# Patient Record
Sex: FEMALE | Race: WHITE
[De-identification: names, ages, dates, MRNs, and addresses within clinical notes are randomized per-mention and may not be internally consistent; named-entity substitution may affect disease eponyms.]

---

## 2018-09-23 ENCOUNTER — HOSPITAL ENCOUNTER (EMERGENCY)
Dept: HOSPITAL 25 - UCEAST | Age: 33
Discharge: HOME | End: 2018-09-23
Payer: COMMERCIAL

## 2018-09-23 VITALS — SYSTOLIC BLOOD PRESSURE: 114 MMHG | DIASTOLIC BLOOD PRESSURE: 74 MMHG

## 2018-09-23 DIAGNOSIS — F17.210: ICD-10-CM

## 2018-09-23 DIAGNOSIS — M65.821: Primary | ICD-10-CM

## 2018-09-23 PROCEDURE — 99201: CPT

## 2018-09-23 PROCEDURE — G0463 HOSPITAL OUTPT CLINIC VISIT: HCPCS

## 2018-09-23 NOTE — UC
Elbow Pain





- HPI Summary


HPI Summary: 


3 weeks of worsening right arm /elbow pain   no injury   got significantly 

worse todays








- History of Current Complaint


Chief Complaint: UCUpperExtremity


Stated Complaint: R ARM PAIN


Time Seen by Provider: 09/23/18 16:11


Hx Obtained From: Patient


Hx Last Menstrual Period: 9/9/18


Pregnant?: No


Onset/Duration: Weeks - 3, Atraumatic, Still Present


Pain Intensity: 9


Pain Scale Used: 0-10 Numeric


Location Of Pain: Is Discrete @ - right elbow


Character: Aching, Throbbing, Burning


Aggravating Factor(s): Movement


Alleviating Factor(s): Nothing


Associated Signs And Symptoms: Positive: Negative





- Allergies/Home Medications


Allergies/Adverse Reactions: 


 Allergies











Allergy/AdvReac Type Severity Reaction Status Date / Time


 


No Known Allergies Allergy   Verified 09/23/18 16:09











Home Medications: 


 Home Medications





Naproxen Sodium [Aleve] 440 mg PO 09/23/18 [History]











PMH/Surg Hx/FS Hx/Imm Hx


Previously Healthy: Yes





- Surgical History


Surgical History: Yes


Surgery Procedure, Year, and Place: c-sect, ovarian cyst





- Family History


Known Family History: Positive: None





- Social History


Occupation: Employed Full-time - at [employer]


Lives: With Family


Alcohol Use: Occasionally


Substance Use Type: None


Smoking Status (MU): Heavy Every Day Tobacco Smoker


Type: Cigarettes


Amount Used/How Often: 1/2 pack day


Have You Smoked in the Last Year: Yes


Household Exposure Type: Cigarettes





Review of Systems


Constitutional: Negative


Skin: Negative


Eyes: Negative


ENT: Negative


Respiratory: Negative


Cardiovascular: Negative


Gastrointestinal: Negative


Genitourinary: Negative


Motor: Negative


Neurovascular: Negative


Musculoskeletal: Arthralgia - right elbow


Neurological: Negative


Psychological: Negative


Is Patient Immunocompromised?: No


All Other Systems Reviewed And Are Negative: Yes





Physical Exam


Triage Information Reviewed: Yes


Appearance: Well-Appearing, Well-Nourished, Pain Distress


Vital Signs: 


 Initial Vital Signs











Temp  98.6 F   09/23/18 16:04


 


Pulse  92   09/23/18 16:04


 


Resp  18   09/23/18 16:04


 


BP  114/74   09/23/18 16:04


 


Pulse Ox  100   09/23/18 16:04











Vital Signs Reviewed: Yes


Eye Exam: Normal


Eyes: Positive: Conjunctiva Clear


ENT Exam: Normal


ENT: Positive: Normal ENT inspection, Hearing grossly normal.  Negative: Trismus

, Muffled voice, Hoarse voice


Dental Exam: Normal


Neck exam: Normal


Neck: Positive: Supple, Nontender


Respiratory Exam: Normal


Respiratory: Positive: Chest non-tender, No respiratory distress, No accessory 

muscle use


Cardiovascular Exam: Normal


Cardiovascular: Positive: RRR, Pulses Normal, Brisk Capillary Refill


Musculoskeletal Exam: Normal


Musculoskeletal: Positive: Strength Intact, No Edema, ROM Limited @ - right 

elbow due to pain


Neurological Exam: Normal


Neurological: Positive: Alert, Muscle Tone Normal


Psychological Exam: Normal


Skin Exam: Normal





Diagnostics





- Radiology


  ** No standard instances


Xray Interpretation: No Acute Changes


Radiology Interpretation Completed By: ED Physician, Radiologist





Elbow Pain Course/Dx





- Course


Course Of Treatment: ace wrap, ice, rest follow with orthopedic MD





- Differential Dx/Diagnosis


Provider Diagnoses: right elbow tendonitis





Discharge





- Sign-Out/Discharge


Documenting (check all that apply): Patient Departure


All imaging exams completed and their final reports reviewed: Yes





- Discharge Plan


Condition: Stable


Disposition: HOME


Patient Education Materials:  Tendinitis (ED), R.I.C.E. Treatment (ED), Safe 

Use of NSAIDs (ED)


Forms:  *Work Release


Referrals: 


Marisa Leblanc MD [Medical Doctor] - 3 Days





- Billing Disposition and Condition


Condition: STABLE


Disposition: Home

## 2018-09-23 NOTE — RAD
INDICATION: Right elbow pain x2 weeks



COMPARISON: None.



TECHNIQUE: 4 views right elbow.



REPORT:  



The visualized bones of the right elbow are well corticated and properly aligned. There is

no radiographically apparent fracture or dislocation. There is no radiographic evidence of

pathologic joint effusion.



IMPRESSION: 

Normal radiograph of the right elbow. 



If the patient's symptoms persist further follow-up imaging is recommended.

## 2020-02-05 ENCOUNTER — HOSPITAL ENCOUNTER (EMERGENCY)
Dept: HOSPITAL 25 - ED | Age: 35
Discharge: HOME | End: 2020-02-05
Payer: SELF-PAY

## 2020-02-05 VITALS — DIASTOLIC BLOOD PRESSURE: 77 MMHG | SYSTOLIC BLOOD PRESSURE: 121 MMHG

## 2020-02-05 DIAGNOSIS — F17.210: ICD-10-CM

## 2020-02-05 DIAGNOSIS — J45.909: ICD-10-CM

## 2020-02-05 DIAGNOSIS — E05.00: ICD-10-CM

## 2020-02-05 DIAGNOSIS — J11.1: Primary | ICD-10-CM

## 2020-02-05 LAB — FLUBV RNA SPEC QL NAA+PROBE: POSITIVE

## 2020-02-05 PROCEDURE — 99282 EMERGENCY DEPT VISIT SF MDM: CPT

## 2020-02-05 RX ADMIN — ACETAMINOPHEN ONE MG: 325 TABLET ORAL at 10:00

## 2020-02-05 NOTE — ED
Influenza-Like Illness





- HPI Summary


HPI Summary: 


34 year old F presenting to [facility] accompanied by male  complains of flu

-like symptoms since Saturday 02/01/2020 with no improvement. Patient reports 

fever, chills, cough, body aches, HA, SOB, and lethargy. Patient denies nausea, 

vomiting, and abd pain. PMHx of graves disease. Pt reports taking ibuprofen. 

SocHx of smoking. The patient rates the pain 4/10 in severity. Symptoms 

aggravated by nothing. Symptoms alleviated by nothing.








- History of Current Complaint


Chief Complaint: EDFluSymptoms


Time Seen by Provider: 02/05/20 09:44


Hx Obtained From: Patient


Onset/Duration: Lasting Days - since 02/01/2020, Still Present


Severity: Moderate


Associated Signs & Symptoms: Negative - negative - nausea, vomiting, abd pain, 

Fever - and chills, Cough, Headache





- Allergy/Home Medications


Allergies/Adverse Reactions: 


 Allergies











Allergy/AdvReac Type Severity Reaction Status Date / Time


 


No Known Allergies Allergy   Verified 02/05/20 09:38














PMH/Surg Hx/FS Hx/Imm Hx


Endocrine/Hematology History: Reports: Hx Thyroid Disease - graves disease


Respiratory History: Reports: Hx Asthma





- Surgical History


Surgery Procedure, Year, and Place: c-sect, ovarian cyst


Infectious Disease History: No


Infectious Disease History: 


   Denies: Traveled Outside the [place] in Last 30 Days





- Family History


Known Family History: 


   Negative: Diabetes





- Social History


Alcohol Use: Occasionally


Substance Use Type: Reports: None


Smoking Status (MU): Heavy Every Day Tobacco Smoker


Type: Cigarettes


Amount Used/How Often: 1/2 pack day


Have You Smoked in the Last Year: Yes





Review of Systems


Positive: Fever, Chills, Other - lethargy


Positive: Shortness Of Breath, Cough


Negative: Abdominal Pain, Vomiting, Nausea


Musculoskeletal: Other - body aches


Positive: Headache


All Other Systems Reviewed And Are Negative: Yes





Physical Exam





- Summary


Physical Exam Summary: 


Constitutional: appears fatigued 


Skin: Warm, Dry


HENT: Normocephalic; Atraumatic


Eyes: Conjunctiva normal


Neck: Musculoskeletal ROM normal neck. (-) JVD, (-) Stridor, (-) Tracheal 

deviation


Cardio: Rhythm regular, rate normal, Heart sounds normal; Intact distal pulses; 

The pedal pulses are 2+ and symmetric. Radial pulses are 2+ and symmetric. (-) 

Murmur


Pulmonary/Chest wall: Effort normal. (-) Respiratory distress, (-) Wheezes, (-) 

Rales


Abd: Soft, (-) tenderness, (-) Distension, (-) Guarding, (-) Rebound


Musculoskeletal: (-) Edema


Lymph: (-) Cervical adenopathy


Neuro: Alert, Oriented x3


Psych: Mood and affect Normal








Triage Information Reviewed: Yes


Vital Signs On Initial Exam: 


 Initial Vitals











Temp Pulse Resp BP Pulse Ox


 


 99.3 F   112   19   107/81   97 


 


 02/05/20 09:34  02/05/20 09:34  02/05/20 09:34  02/05/20 09:34  02/05/20 09:34











Vital Signs Reviewed: Yes





Procedures





- Sedation


Patient Received Moderate/Deep Sedation with Procedure: No





Diagnostics





- Vital Signs


 Vital Signs











  Temp Pulse Resp BP Pulse Ox


 


 02/05/20 09:34  99.3 F  112  19  107/81  97














- Laboratory


Lab Results: 


 Lab Results











  02/05/20 Range/Units





  09:36 


 


Influenza A (Rapid)  Pending  


 


Influenza B (Rapid)  Pending  











Lab Statement: Any lab studies that have been ordered have been reviewed, and 

results considered in the medical decision making process.





Flu Symptom Course/Dx





- Course


Course Of Treatment: 34 year old F presenting to [facility] complains of flu-like 

symptoms since Saturday 02/01/2020 with no improvement. Patient reports fever, 

chills, cough, body aches, HA, SOB, and lethargy.  Physical exam findings: Gen: 

appears fatigued.  Bloodwork results with no significant abnormalities except 

for Positive A Influenza B.  In the ED course, the patient was given 650 mg 

Acetaminophen.  Patient will be discharged home with a follow with the [facility] in 3 days. Patient was instructed to return to Emergency 

Department for new or worsening symptoms. Patient understands and is agreeable 

to this plan.





- Diagnoses


Provider Diagnoses: 


 Influenza








Discharge ED





- Sign-Out/Discharge


Documenting (check all that apply): Patient Departure - discharge





- Discharge Plan


Condition: Stable


Disposition: HOME


Patient Education Materials:  Influenza (ED)


Forms:  *Work Release


Referrals: 


[facility] [Outside]


Additional Instructions: 


Follow up with the [facility] clinic in 3 days. Return to the Emergency 

Department for new or worsening symptoms.





- Billing Disposition and Condition


Condition: STABLE


Disposition: Home





- Attestation Statements


Document Initiated by Scribe: Yes


Documenting Scribe: Bassam Bradford


Provider For Whom Scribe is Documenting (Include Credential): Mehran Mckee DO


Scribe Attestation: 


I, Bassam Bradford, scribed for Mehran Mckee DO on 02/05/20 at 1236. 


Scribe Documentation Reviewed: Yes


Provider Attestation: 


The documentation as recorded by the scribe, Bassam Bradford accurately 

reflects the service I personally performed and the decisions made by me, 

Mehran Mckee DO


Status of Scribe Document: Viewed